# Patient Record
Sex: MALE | Race: WHITE
[De-identification: names, ages, dates, MRNs, and addresses within clinical notes are randomized per-mention and may not be internally consistent; named-entity substitution may affect disease eponyms.]

---

## 2019-12-19 ENCOUNTER — HOSPITAL ENCOUNTER (EMERGENCY)
Dept: HOSPITAL 74 - JERFT | Age: 19
Discharge: HOME | End: 2019-12-19
Payer: COMMERCIAL

## 2019-12-19 VITALS — DIASTOLIC BLOOD PRESSURE: 57 MMHG | TEMPERATURE: 98.2 F | HEART RATE: 92 BPM | SYSTOLIC BLOOD PRESSURE: 111 MMHG

## 2019-12-19 VITALS — BODY MASS INDEX: 21.6 KG/M2
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
## 2019-12-19 DIAGNOSIS — S69.82XA: Primary | ICD-10-CM

## 2019-12-19 PROCEDURE — 2W3KX1Z IMMOBILIZATION OF LEFT FINGER USING SPLINT: ICD-10-PCS

## 2019-12-19 NOTE — PDOC
History of Present Illness





- General


Chief Complaint: Pain


Stated Complaint: FINGER INJURY


Time Seen by Provider: 12/19/19 19:37


History Source: Patient


Exam Limitations: No Limitations





- History of Present Illness


Initial Comments: 





12/19/19 20:46


was playimng football today and hyperflexed/ jammed left 5th digit. Has pain 

and swelling to PIP and DIP with limnited ROM. 


Occurred: reports: just prior to arrival, this afternoon


Severity: reports: mild, moderate


Pain Location: reports: upper extremity (left 5th digit )


Modifying Factors: improves with: cold therapy


Associated Symptoms (Fall): denies symptoms





Past History





- Travel


Traveled outside of the country in the last 30 days: No


Close contact w/someone who was outside of country & ill: No





- Past Medical History


Allergies/Adverse Reactions: 


 Allergies











Allergy/AdvReac Type Severity Reaction Status Date / Time


 


No Known Allergies Allergy   Verified 12/19/19 19:40











COPD: No





- Psycho Social/Smoking Cessation Hx


Smoking History: Never smoked





**Review of Systems





- Review of Systems


Able to Perform ROS?: Yes


Is the patient limited English proficient: Yes


Constitutional: Yes: Symptoms Reported, See HPI.  No: Fever, Malaise


Musculoskeletal: Yes: Symptoms Reported, See HPI, Joint Pain (left 5th digit)


Integumentary: Yes: Symptoms Reported, See HPI, Bruising


All Other Systems: Reviewed and Negative





*Physical Exam





- Vital Signs


 Last Vital Signs











Temp Pulse Resp BP Pulse Ox


 


 98.2 F   92 H  19   111/57 L  100 


 


 12/19/19 19:37  12/19/19 19:37  12/19/19 19:37  12/19/19 19:37  12/19/19 19:37














- Physical Exam


General Appearance: Yes: Nourished, Appropriately Dressed, Apparent Distress, 

Mild Distress


HEENT: positive: MATTHEW, Normal ENT Inspection, TMs Normal, Pharynx Normal


Neck: positive: Supple.  negative: Tender


Respiratory/Chest: positive: Lungs Clear


Musculoskeletal: positive: Normal Inspection


Extremity: positive: Normal Capillary Refill, Normal Inspection.  negative: 

Normal Range of Motion (limited due to pain )


Integumentary: positive: Dry, Warm, Pale, Bruising (swelling and tendermess to 

PIP with limited ROM, sensation intact to distal digit)


Neurologic: positive: CNs II-XII NML intact, Fully Oriented, Alert, Normal Mood/

Affect, Normal Response, Motor Strength 5/5





ED Treatment Course





- RADIOLOGY


Radiology Studies Ordered: 














 Category Date Time Status


 


 FINGER(S) LEFT [RAD] Stat Radiology  12/19/19 19:38 Ordered














ED Progress Note





- Progress Note


Progress Note: 





12/19/19 20:49


finger 





Discharge





- Discharge Information


Problems reviewed: Yes


Clinical Impression/Diagnosis: 


Finger injury


Qualifiers:


 Encounter type: initial encounter Laterality: left Qualified Code(s): S69.92XA 

- Unspecified injury of left wrist, hand and finger(s), initial encounter





Condition: Stable


Disposition: HOME





- Admission


No





- Follow up/Referral


Referrals: 


Case Cartagena MD [Staff Physician] - 





- Patient Discharge Instructions


Patient Printed Discharge Instructions:  DI for Finger Sprain


Additional Instructions: 


Rest, Ice , Elevate 


Splint on until pain resolves or cleared 





- Post Discharge Activity


Work/Back to School Note:  Back to Work

## 2019-12-19 NOTE — PDOC
Rapid Medical Evaluation


Chief Complaint: Pain


Time Seen by Provider: 12/19/19 19:37


Medical Evaluation: 





12/19/19 19:39


I have performed a brief in-person evaluation of this patient.


The patient presents with a chief complaint of:left 5th digit pain / playing 

football and twisted 


Pertinent physical exam findings: swollen and painful PIP-DIP. 


I have ordered the following: XraY


The patient will proceed to the ED for further evaluation.





**Discharge Disposition





- Diagnosis


 Finger injury








- Discharge Dispostion


Condition at time of disposition: Stable





- Referrals





- Patient Instructions





- Post Discharge Activity